# Patient Record
Sex: MALE | Race: WHITE | NOT HISPANIC OR LATINO | Employment: OTHER | URBAN - NONMETROPOLITAN AREA
[De-identification: names, ages, dates, MRNs, and addresses within clinical notes are randomized per-mention and may not be internally consistent; named-entity substitution may affect disease eponyms.]

---

## 2024-02-13 ENCOUNTER — OFFICE VISIT (OUTPATIENT)
Dept: NEUROSURGERY | Facility: CLINIC | Age: 59
End: 2024-02-13
Payer: OTHER GOVERNMENT

## 2024-02-13 VITALS — BODY MASS INDEX: 25.82 KG/M2 | WEIGHT: 194.8 LBS | HEIGHT: 73 IN

## 2024-02-13 DIAGNOSIS — Z78.9 NONSMOKER: ICD-10-CM

## 2024-02-13 DIAGNOSIS — E66.3 OVERWEIGHT WITH BODY MASS INDEX (BMI) OF 25 TO 25.9 IN ADULT: ICD-10-CM

## 2024-02-13 DIAGNOSIS — M47.812 CERVICAL SPONDYLOSIS WITHOUT MYELOPATHY: Primary | ICD-10-CM

## 2024-02-13 DIAGNOSIS — R20.2 PARESTHESIAS: ICD-10-CM

## 2024-02-13 PROCEDURE — 99204 OFFICE O/P NEW MOD 45 MIN: CPT | Performed by: PHYSICIAN ASSISTANT

## 2024-02-13 RX ORDER — AMLODIPINE BESYLATE 2.5 MG/1
2.5 TABLET ORAL DAILY
COMMUNITY
Start: 2023-10-23

## 2024-02-13 RX ORDER — NALOXONE HYDROCHLORIDE 4 MG/.1ML
1 SPRAY NASAL DAILY PRN
COMMUNITY
Start: 2023-09-28

## 2024-02-13 RX ORDER — RABEPRAZOLE SODIUM 20 MG/1
20 TABLET, DELAYED RELEASE ORAL DAILY
COMMUNITY
Start: 2023-10-23

## 2024-02-13 RX ORDER — ATORVASTATIN CALCIUM 10 MG/1
10 TABLET, FILM COATED ORAL DAILY
COMMUNITY

## 2024-02-13 RX ORDER — VALSARTAN 80 MG/1
80 TABLET ORAL DAILY
COMMUNITY

## 2024-02-13 RX ORDER — TAMSULOSIN HYDROCHLORIDE 0.4 MG/1
CAPSULE ORAL
COMMUNITY
Start: 2023-12-13

## 2024-02-13 RX ORDER — GABAPENTIN 100 MG/1
CAPSULE ORAL
Qty: 90 CAPSULE | Refills: 0 | Status: SHIPPED | OUTPATIENT
Start: 2024-02-13

## 2024-02-14 ENCOUNTER — APPOINTMENT (OUTPATIENT)
Dept: OTHER | Facility: HOSPITAL | Age: 59
End: 2024-02-14
Payer: OTHER GOVERNMENT

## 2024-03-04 DIAGNOSIS — R20.2 PARESTHESIAS: ICD-10-CM

## 2024-03-04 DIAGNOSIS — M47.812 CERVICAL SPONDYLOSIS WITHOUT MYELOPATHY: ICD-10-CM

## 2024-04-24 ENCOUNTER — OFFICE VISIT (OUTPATIENT)
Dept: NEUROSURGERY | Facility: CLINIC | Age: 59
End: 2024-04-24
Payer: OTHER GOVERNMENT

## 2024-04-24 VITALS — WEIGHT: 194 LBS | BODY MASS INDEX: 25.71 KG/M2 | HEIGHT: 73 IN

## 2024-04-24 DIAGNOSIS — M54.16 LUMBAR RADICULOPATHY: Primary | ICD-10-CM

## 2024-04-24 DIAGNOSIS — Z78.9 NON-SMOKER: ICD-10-CM

## 2024-04-24 DIAGNOSIS — M47.812 CERVICAL SPONDYLOSIS WITHOUT MYELOPATHY: ICD-10-CM

## 2024-04-24 DIAGNOSIS — E66.3 OVERWEIGHT (BMI 25.0-29.9): ICD-10-CM

## 2024-04-24 RX ORDER — GABAPENTIN 100 MG/1
100 CAPSULE ORAL 3 TIMES DAILY
COMMUNITY
Start: 2024-04-01

## 2024-04-24 NOTE — PROGRESS NOTES
"    Chief complaint:   Chief Complaint   Patient presents with    Follow-up     Follow up neck pain, pins and needles bilateral hands right greater than left        Subjective     HPI: I had a chance to see Yang today in follow-up and to review his imaging studies of the cervical spine.  He does have some foraminal stenosis at C5/6 which is consistent with his arm paresthesias however he is improving with traction and physical therapy and therefore I think we can hold off on any interventions for now.  He is having increasing difficulty with walking and significant back pain and does sound like he is suffering from neurogenic claudication.  He is doing physical therapy for his back however it is not helping at this time and I think I would like to get an MRI to see exactly what is going on with his back.    Review of Systems      Objective      Vital Signs  Ht 185.4 cm (73\")   Wt 88 kg (194 lb)   BMI 25.60 kg/m²     Physical Exam  Neurological:      Mental Status: He is oriented to person, place, and time.      Cranial Nerves: Cranial nerves 2-12 are intact.      Motor: Motor strength is normal.     Gait: Gait is intact.   Psychiatric:         Speech: Speech normal.         Neurologic Exam     Mental Status   Oriented to person, place, and time.   Attention: normal. Concentration: normal.   Speech: speech is normal   Level of consciousness: alert  Knowledge: good.   Normal comprehension.     Cranial Nerves   Cranial nerves II through XII intact.     Motor Exam     Strength   Strength 5/5 throughout.     Sensory Exam   Light touch normal.     Gait, Coordination, and Reflexes     Gait  Gait: normal      Imaging review:   Cervical spine MRI shows mild multilevel degenerative disc disease which is most advanced at C5/6 and to a lesser degree C6/7.  There is bilateral foraminal stenosis at C5/6 and to a lesser degree at C6/7.  No severe central canal stenosis        Assessment/Plan:   Cervical foraminal stenosis with " radiculopathy  Cervical degenerative disc disease  Low back pain with neurogenic claudication    We will order an MRI of the lumbar spine and he will continue with physical therapy for now.  I would like him to follow-up with us in 2 to 3 months to check on his neck and review the MRI of his back.  I look forward to seeing him at his next visit.    Patient is a nonsmoker  The patient's Body mass index is 25.6 kg/m².. BMI is overweight and he will continue with current weight management procedures.    Diagnoses and all orders for this visit:    1. Lumbar radiculopathy (Primary)  -     MRI Lumbar Spine Without Contrast; Future    2. Overweight (BMI 25.0-29.9)    3. Non-smoker    4. Cervical spondylosis without myelopathy        I discussed the patients findings and my recommendations with patient  Jeremías Hannah DO  04/24/24  11:49 CDT

## 2024-06-28 ENCOUNTER — TELEPHONE (OUTPATIENT)
Dept: NEUROSURGERY | Facility: CLINIC | Age: 59
End: 2024-06-28

## 2024-06-28 NOTE — TELEPHONE ENCOUNTER
DEJAH  @ [facility] CALLED STATING THAT [facility] IS RESPONSIBLE FOR ANY BILLS FOR THIS PATIENT    AUTH #: [id number] GOOD FROM 04/03/2024 - 10/21/24    PLEASE UPDATE  THANK YOU

## 2024-07-03 ENCOUNTER — OFFICE VISIT (OUTPATIENT)
Dept: NEUROSURGERY | Facility: CLINIC | Age: 59
End: 2024-07-03
Payer: OTHER GOVERNMENT

## 2024-07-03 VITALS — HEIGHT: 73 IN | BODY MASS INDEX: 25.71 KG/M2 | WEIGHT: 194 LBS

## 2024-07-03 DIAGNOSIS — M54.50 CHRONIC BILATERAL LOW BACK PAIN, UNSPECIFIED WHETHER SCIATICA PRESENT: ICD-10-CM

## 2024-07-03 DIAGNOSIS — G89.29 CHRONIC BILATERAL LOW BACK PAIN, UNSPECIFIED WHETHER SCIATICA PRESENT: ICD-10-CM

## 2024-07-03 DIAGNOSIS — M54.16 LUMBAR RADICULOPATHY: Primary | ICD-10-CM

## 2024-07-03 DIAGNOSIS — E66.3 OVERWEIGHT (BMI 25.0-29.9): ICD-10-CM

## 2024-07-03 DIAGNOSIS — Z78.9 NON-SMOKER: ICD-10-CM

## 2024-07-03 NOTE — PROGRESS NOTES
"    Chief complaint:   Chief Complaint   Patient presents with    Follow-up     Follow up neck pain, bilateral hands numb and tingle discuss MRI        Subjective     HPI: I had a chance to see Yang today in follow-up and to review his imaging studies of the lumbar spine.  He does indeed have a very well-preserved lumbar spine other than L4/5 which is beginning to show some degeneration and more importantly there are some signs that he may have some instability at this level.  It does sound like he is suffering from an L4 radiculopathy at times and therefore I would like to get some flexion-extension films to better evaluate.    Review of Systems      Objective      Vital Signs  Ht 185.4 cm (73\")   Wt 88 kg (194 lb)   BMI 25.60 kg/m²     Physical Exam  Neurological:      Mental Status: He is oriented to person, place, and time.      Cranial Nerves: Cranial nerves 2-12 are intact.      Motor: Motor strength is normal.     Gait: Gait is intact.   Psychiatric:         Speech: Speech normal.         Neurologic Exam     Mental Status   Oriented to person, place, and time.   Attention: normal. Concentration: normal.   Speech: speech is normal   Level of consciousness: alert  Knowledge: good.   Normal comprehension.     Cranial Nerves   Cranial nerves II through XII intact.     Motor Exam     Strength   Strength 5/5 throughout.     Sensory Exam   Light touch normal.     Gait, Coordination, and Reflexes     Gait  Gait: normal      Imaging review:   MRI of the lumbar spine does show very mild degenerative changes throughout the lumbar spine except for L4/5 which shows a slight anterolisthesis with foraminal stenosis at this level and more advanced disc degeneration which is mild to moderate in nature.        Assessment/Plan:   L4 radicular symptoms with L4 on 5 anterolisthesis    I would like to get some flexion-extension films and we will also get him set up for some epidural steroid injections versus facet injections " to see if we can keep the pain under control without surgical intervention.  I would like to see him back in 2 months to check on his progress.  I look forward to seeing him at his next visit.    Patient is a nonsmoker  The patient's Body mass index is 25.6 kg/m².. BMI is above normal parameters. Recommendations include: continue with current weight loss program    Diagnoses and all orders for this visit:    1. Lumbar radiculopathy (Primary)    2. Overweight (BMI 25.0-29.9)    3. Non-smoker    4. Chronic bilateral low back pain, unspecified whether sciatica present  -     XR Spine Lumbar Complete With Flex & Ext; Future        I discussed the patients findings and my recommendations with patient  Jeremías Hannah DO  07/03/24  11:07 CDT

## 2024-07-31 DIAGNOSIS — M54.16 LUMBAR RADICULOPATHY: Primary | ICD-10-CM

## 2024-09-18 ENCOUNTER — OFFICE VISIT (OUTPATIENT)
Dept: NEUROSURGERY | Facility: CLINIC | Age: 59
End: 2024-09-18
Payer: OTHER GOVERNMENT

## 2024-09-18 VITALS — WEIGHT: 194 LBS | HEIGHT: 73 IN | BODY MASS INDEX: 25.71 KG/M2

## 2024-09-18 DIAGNOSIS — E66.3 OVERWEIGHT (BMI 25.0-29.9): ICD-10-CM

## 2024-09-18 DIAGNOSIS — Z78.9 NON-SMOKER: Primary | ICD-10-CM

## 2024-09-18 RX ORDER — PREGABALIN 25 MG/1
25 CAPSULE ORAL 2 TIMES DAILY
COMMUNITY

## 2025-07-28 ENCOUNTER — TELEPHONE (OUTPATIENT)
Dept: NEUROSURGERY | Facility: CLINIC | Age: 60
End: 2025-07-28
Payer: OTHER GOVERNMENT

## 2025-07-28 NOTE — TELEPHONE ENCOUNTER
This patients [facility] auth [id number] for cervical & lumbar  on 25, if patient reschedules please let me know so I can submit an RFS for continued auth.